# Patient Record
Sex: MALE | URBAN - METROPOLITAN AREA
[De-identification: names, ages, dates, MRNs, and addresses within clinical notes are randomized per-mention and may not be internally consistent; named-entity substitution may affect disease eponyms.]

---

## 2017-09-04 LAB
COLOR STL: NORMAL
CONSISTENCY STL: NORMAL
HEMOCCULT SP1 STL QL: NEGATIVE

## 2017-09-05 LAB
COLOR STL: NORMAL
CONSISTENCY STL: NORMAL
HEMOCCULT SP2 STL QL: NEGATIVE

## 2017-09-06 LAB
COLOR STL: NORMAL
CONSISTENCY STL: NORMAL

## 2017-09-11 ENCOUNTER — HISTORICAL (OUTPATIENT)
Dept: GASTROENTEROLOGY | Facility: CLINIC | Age: 62
End: 2017-09-11

## 2017-09-16 LAB — FINAL CULTURE: NORMAL

## 2017-10-23 ENCOUNTER — HISTORICAL (OUTPATIENT)
Dept: ENDOSCOPY | Facility: HOSPITAL | Age: 62
End: 2017-10-23

## 2022-04-10 ENCOUNTER — HISTORICAL (OUTPATIENT)
Dept: ADMINISTRATIVE | Facility: HOSPITAL | Age: 67
End: 2022-04-10

## 2022-04-28 VITALS
HEIGHT: 63 IN | DIASTOLIC BLOOD PRESSURE: 82 MMHG | OXYGEN SATURATION: 99 % | BODY MASS INDEX: 23.55 KG/M2 | SYSTOLIC BLOOD PRESSURE: 134 MMHG | WEIGHT: 132.94 LBS

## 2022-05-04 NOTE — HISTORICAL [facility] CERNER
This is a historical note converted from [place]. Formatting and pictures may have been removed.  Please reference [place] for original formatting and attached multimedia. Again today tried to reach p + AGAIN NO ANSWER AND NO OPTION TO LEAVE MESSAGE _==need to inform of stool (+) Pt (+)?Campylobacter? and H-pyloric = trying to reach for treatment?Campylobacter?with Levaquin 750mg po q day x 14 days high resistance? - pt from [place]-?will need to be treated for H-pylic after completing treatment for Campylobacter.

## 2022-05-04 NOTE — HISTORICAL [facility] CERNER
This is a historical note converted from [place]. Formatting and pictures may have been removed.  Please reference [place] for original formatting and attached multimedia. finally reached pt per phone =- pt voiced understanding of conversation but English is very broken - pt from [place]-?.? Explain?(+)?Campylobacter? and H-pyloric =-  treatment?1Srt Campylobacter?with Levaquin 750mg po q day x 14 days high resistance? - complete all medication and then when completely finished - then treat H-Pyloric Flagyl 500mg po BID, [id number] po BID and omeprazole 20mg po BID - all x 14 days - spoke with Pharmacist and orders given verbally with instruction to complete Levaquin prior to filling and treating H-Pyloric - Pharmacist voiced?understanding.?? Jeremias Glass Dr. -?

## 2022-05-04 NOTE — HISTORICAL [facility] CERNER
This is a historical note converted from [place]. Formatting and pictures may have been removed.  Please reference [place] for original formatting and attached multimedia. labs called =[phone] - NO ANSWER AND NO MESSAGE AVALIABLE- Pt (+)?Campylobacter? and H-pyloric =-  treatment?Campylobacter?with Levaquin 750mg po q day x 14 days high resistance? - pt from [place]-? called pt - teaching reinforced noted from Up to date...?Interruption of transmission from poultry is a major factor in preventing human Campylobacter infection. Chicken should be cooked thoroughly. Utensils, cutting boards, and other items used in preparation of raw poultry should be washed thoroughly. Avoidance of unpasteurized dairy products is also an important preventive measure. Adults with Campylobacter enteritis do not require special isolation; standard precautions are sufficient. Nosocomial infection in  nurseries has been described. Infants and younger children in diapers with C. jejuni enteritis should be excluded from routine childcare centers until diarrhea has resolved. Individuals with an acute diarrheal illness should not prepare or handle food until symptoms have resolved.  UNABLE TO TREAT - UNABLE TO REACH PER PHONE  ?  H-pyloric will be treated second - must complete treatment Campylobacter?with Levaquin 750mg po q day x 14 days? first - then treate triple therpy second.